# Patient Record
Sex: FEMALE | Race: BLACK OR AFRICAN AMERICAN | Employment: FULL TIME | URBAN - METROPOLITAN AREA
[De-identification: names, ages, dates, MRNs, and addresses within clinical notes are randomized per-mention and may not be internally consistent; named-entity substitution may affect disease eponyms.]

---

## 2022-01-01 ENCOUNTER — HOSPITAL ENCOUNTER (EMERGENCY)
Age: 32
Discharge: HOME | End: 2022-01-01
Attending: EMERGENCY MEDICINE
Payer: COMMERCIAL

## 2022-01-01 VITALS
HEART RATE: 106 BPM | SYSTOLIC BLOOD PRESSURE: 125 MMHG | TEMPERATURE: 99.5 F | RESPIRATION RATE: 18 BRPM | OXYGEN SATURATION: 97 % | DIASTOLIC BLOOD PRESSURE: 84 MMHG

## 2022-01-01 DIAGNOSIS — U07.1 COVID-19: Primary | ICD-10-CM

## 2022-01-01 LAB
COVID-19 RAPID TEST, COVR: DETECTED
SOURCE, COVRS: ABNORMAL

## 2022-01-01 PROCEDURE — 87635 SARS-COV-2 COVID-19 AMP PRB: CPT

## 2022-01-01 PROCEDURE — 99283 EMERGENCY DEPT VISIT LOW MDM: CPT

## 2022-01-01 NOTE — ED NOTES
I have reviewed discharge instructions with the patient. The patient verbalized understanding. Patient left ED via Discharge Method: ambulatory to Home with self. Opportunity for questions and clarification provided. Patient given 0 scripts. To continue your aftercare when you leave the hospital, you may receive an automated call from our care team to check in on how you are doing. This is a free service and part of our promise to provide the best care and service to meet your aftercare needs.  If you have questions, or wish to unsubscribe from this service please call [phone]. Thank you for Choosing our [facility] Emergency Department.

## 2022-01-01 NOTE — ED PROVIDER NOTES
HPI Salma is a 26 yo F with hx htn and diabetes, here for URI symptoms. Started Wednesday. Has been unable to get covid tested because she gets weak with excessive activity. Tachy 110 upon arrival, oxygen around 90%, improves at rest. Complains of fever, cough, chills, decreased appetite, weakness. Is vaccinated twice. Tolerating po fluids. No past medical history on file. No past surgical history on file. No family history on file. Social History     Socioeconomic History    Marital status: Not on file     Spouse name: Not on file    Number of children: Not on file    Years of education: Not on file    Highest education level: Not on file   Occupational History    Not on file   Tobacco Use    Smoking status: Not on file    Smokeless tobacco: Not on file   Substance and Sexual Activity    Alcohol use: Not on file    Drug use: Not on file    Sexual activity: Not on file   Other Topics Concern    Not on file   Social History Narrative    Not on file     Social Determinants of Health     Financial Resource Strain:     Difficulty of Paying Living Expenses: Not on file   Food Insecurity:     Worried About Running Out of Food in the Last Year: Not on file    Jeremie of Food in the Last Year: Not on file   Transportation Needs:     Lack of Transportation (Medical): Not on file    Lack of Transportation (Non-Medical):  Not on file   Physical Activity:     Days of Exercise per Week: Not on file    Minutes of Exercise per Session: Not on file   Stress:     Feeling of Stress : Not on file   Social Connections:     Frequency of Communication with Friends and Family: Not on file    Frequency of Social Gatherings with Friends and Family: Not on file    Attends Church Services: Not on file    Active Member of Clubs or Organizations: Not on file    Attends Club or Organization Meetings: Not on file    Marital Status: Not on file   Intimate Partner Violence:     Fear of Current or Ex-Partner: Not on file    Emotionally Abused: Not on file    Physically Abused: Not on file    Sexually Abused: Not on file   Housing Stability:     Unable to Pay for Housing in the Last Year: Not on file    Number of Places Lived in the Last Year: Not on file    Unstable Housing in the Last Year: Not on file         ALLERGIES: Patient has no allergy information on record. Review of Systems   Constitutional: Positive for appetite change (decreased), chills and fever. Negative for activity change. HENT: Positive for congestion. Negative for sore throat. Respiratory: Positive for cough. Negative for shortness of breath. Gastrointestinal: Negative for abdominal pain, diarrhea, nausea and vomiting. Genitourinary: Negative for difficulty urinating. Musculoskeletal: Negative for arthralgias. Skin: Negative for wound. Neurological: Positive for weakness. Negative for headaches. Hematological: Negative. There were no vitals filed for this visit. Physical Exam  Constitutional:       Appearance: Normal appearance. She is obese. She is ill-appearing. HENT:      Mouth/Throat:      Mouth: Mucous membranes are moist.      Pharynx: Oropharynx is clear. Eyes:      Pupils: Pupils are equal, round, and reactive to light. Cardiovascular:      Rate and Rhythm: Normal rate and regular rhythm. Pulmonary:      Effort: Pulmonary effort is normal. No respiratory distress. Breath sounds: Normal breath sounds. Comments: Respiration even and unlabored  Abdominal:      General: Bowel sounds are normal.      Tenderness: There is no abdominal tenderness. There is no guarding. Musculoskeletal:      Cervical back: Normal range of motion. Skin:     General: Skin is warm. Neurological:      General: No focal deficit present. Mental Status: She is alert and oriented to person, place, and time. Mental status is at baseline.    Psychiatric:         Mood and Affect: Mood normal. Thought Content: Thought content normal.      Comments: In no acute distress. TRISTON Marsh is a 26 yo F with hx htn and diabetes, here for URI symptoms. Will test for COVID. Mother is positive. Did have drop in o2 upon arrival in triage. Improved at rest. Is tachycardic. Will recommend ibuprofen and tylenol. covid +. Discharge home with symptomatic management. Her oxygen stable and his heart rate is come down. Work note provided. Safe for discharge at this time with strict return precautions. Dispo: Stable, Discharge to home    Diagnosis:   1.covid-19  2. Shine Hurtado NP  1:29 PM      Eugene Richard NP; 1/1/2022 @1:29 PM Voice dictation software was used during the making of this note. This software is not perfect and grammatical and other typographical errors may be present.   This note has not been proofread for errors.  ===================================================================

## 2022-01-01 NOTE — Clinical Note
[id number] [address] EMERGENCY DEPT  [address] [address] [phone]    Work/School Note    Date: 1/1/2022     To Whom It May concern:    Mackenzie iJm was evaluated by the following provider(s):  No providers found. COVID19 virus is suspected. Per the CDC guidelines we recommend home isolation until the following conditions are all met:    1. At least five days have passed since symptoms first appeared and/or had a close exposure,   2. After home isolation for five days, wearing a mask around others for the next five days,  3. At least 24 have passed since last fever without the use of fever-reducing medications and  4.  Symptoms (eg cough, shortness of breath) have improved      Sincerely,          Erica Pablo, NP

## 2022-05-01 ENCOUNTER — HOSPITAL ENCOUNTER (EMERGENCY)
Age: 32
Discharge: HOME | End: 2022-05-01
Attending: EMERGENCY MEDICINE
Payer: COMMERCIAL

## 2022-05-01 VITALS
RESPIRATION RATE: 16 BRPM | WEIGHT: 241 LBS | HEART RATE: 87 BPM | HEIGHT: 62 IN | OXYGEN SATURATION: 98 % | TEMPERATURE: 98.4 F | SYSTOLIC BLOOD PRESSURE: 138 MMHG | DIASTOLIC BLOOD PRESSURE: 82 MMHG | BODY MASS INDEX: 44.35 KG/M2

## 2022-05-01 DIAGNOSIS — L02.91 ABSCESS: Primary | ICD-10-CM

## 2022-05-01 PROCEDURE — 75810000289 HC I&D ABSCESS SIMP/COMP/MULT

## 2022-05-01 PROCEDURE — 99283 EMERGENCY DEPT VISIT LOW MDM: CPT

## 2022-05-01 PROCEDURE — 74011250637 HC RX REV CODE- 250/637: Performed by: EMERGENCY MEDICINE

## 2022-05-01 RX ORDER — BUSPIRONE HYDROCHLORIDE 10 MG/1
10 TABLET ORAL 3 TIMES DAILY
COMMUNITY

## 2022-05-01 RX ORDER — IBUPROFEN 800 MG/1
800 TABLET ORAL
Status: COMPLETED | OUTPATIENT
Start: 2022-05-01 | End: 2022-05-01

## 2022-05-01 RX ORDER — PREGABALIN 75 MG/1
75 CAPSULE ORAL 2 TIMES DAILY
COMMUNITY

## 2022-05-01 RX ORDER — ALBUTEROL SULFATE 90 UG/1
1 AEROSOL, METERED RESPIRATORY (INHALATION)
COMMUNITY

## 2022-05-01 RX ORDER — SULFAMETHOXAZOLE AND TRIMETHOPRIM 800; 160 MG/1; MG/1
1 TABLET ORAL 2 TIMES DAILY
Qty: 14 TABLET | Refills: 0 | Status: SHIPPED | OUTPATIENT
Start: 2022-05-01

## 2022-05-01 RX ORDER — AMLODIPINE BESYLATE 5 MG/1
5 TABLET ORAL DAILY
COMMUNITY

## 2022-05-01 RX ORDER — LISINOPRIL 5 MG/1
5 TABLET ORAL DAILY
COMMUNITY

## 2022-05-01 RX ORDER — DOCUSATE SODIUM 100 MG/1
100 CAPSULE, LIQUID FILLED ORAL 2 TIMES DAILY
COMMUNITY

## 2022-05-01 RX ORDER — CLINDAMYCIN PHOSPHATE 10 MG/G
GEL TOPICAL 2 TIMES DAILY
COMMUNITY

## 2022-05-01 RX ORDER — SULFAMETHOXAZOLE AND TRIMETHOPRIM 800; 160 MG/1; MG/1
2 TABLET ORAL
Status: COMPLETED | OUTPATIENT
Start: 2022-05-01 | End: 2022-05-01

## 2022-05-01 RX ORDER — PANTOPRAZOLE SODIUM 20 MG/1
20 TABLET, DELAYED RELEASE ORAL DAILY
COMMUNITY

## 2022-05-01 RX ORDER — DEXTROAMPHETAMINE SULFATE 10 MG/1
10 TABLET ORAL DAILY
COMMUNITY

## 2022-05-01 RX ORDER — SERTRALINE HYDROCHLORIDE 100 MG/1
100 TABLET, FILM COATED ORAL DAILY
COMMUNITY

## 2022-05-01 RX ADMIN — SULFAMETHOXAZOLE AND TRIMETHOPRIM 2 TABLET: 800; 160 TABLET ORAL at 02:16

## 2022-05-01 RX ADMIN — IBUPROFEN 800 MG: 800 TABLET, FILM COATED ORAL at 02:16

## 2022-05-01 NOTE — DISCHARGE INSTRUCTIONS
Follow-up with your family doctor or return here Tuesday morning for packing removal    Alternate 4 ibuprofen every 8 hours with 2 extra-strength tylenol every 6 hours

## 2022-05-01 NOTE — ED TRIAGE NOTES
Pt states that she has two boils bilaterally in the creases between her groin and leg. She complains that it is too painful to walk. This issue has been going on for years, she hasa had them drained multiple times but they keep coming back.  Pt also states she has hx of DM2 and HTN

## 2022-05-01 NOTE — ED PROVIDER NOTES
Chief complaint : Abscess    HISTORY OF PRESENT ILLNESS :  Location : Bilateral groin/medial thigh and    Quality : Painful    Quantity : Large and unruptured on the right, smaller and draining on the left    Timing : 3 or 4 days    Severity : Moderate    Context : History of multiple prior, most recent more than a year ago    Alleviating / exacerbating factors : None    Associated Symptoms : No fevers             No past medical history on file. No past surgical history on file. No family history on file. Social History     Socioeconomic History    Marital status: SINGLE     Spouse name: Not on file    Number of children: Not on file    Years of education: Not on file    Highest education level: Not on file   Occupational History    Not on file   Tobacco Use    Smoking status: Not on file    Smokeless tobacco: Not on file   Substance and Sexual Activity    Alcohol use: Not on file    Drug use: Not on file    Sexual activity: Not on file   Other Topics Concern    Not on file   Social History Narrative    Not on file     Social Determinants of Health     Financial Resource Strain:     Difficulty of Paying Living Expenses: Not on file   Food Insecurity:     Worried About Running Out of Food in the Last Year: Not on file    Jeremie of Food in the Last Year: Not on file   Transportation Needs:     Lack of Transportation (Medical): Not on file    Lack of Transportation (Non-Medical):  Not on file   Physical Activity:     Days of Exercise per Week: Not on file    Minutes of Exercise per Session: Not on file   Stress:     Feeling of Stress : Not on file   Social Connections:     Frequency of Communication with Friends and Family: Not on file    Frequency of Social Gatherings with Friends and Family: Not on file    Attends Caodaism Services: Not on file    Active Member of Clubs or Organizations: Not on file    Attends Club or Organization Meetings: Not on file    Marital Status: Not on file   Intimate Partner Violence:     Fear of Current or Ex-Partner: Not on file    Emotionally Abused: Not on file    Physically Abused: Not on file    Sexually Abused: Not on file   Housing Stability:     Unable to Pay for Housing in the Last Year: Not on file    Number of [place] in the Last Year: Not on file    Unstable Housing in the Last Year: Not on file         ALLERGIES: Penicillin g    Review of Systems   Constitutional: Negative for chills and fever. Skin: Positive for color change and wound. All other systems reviewed and are negative. Vitals:    05/01/22 0121   BP: 138/82   Pulse: 87   Resp: 16   Temp: 98.4 °F (36.9 °C)   SpO2: 98%   Weight: 109.3 kg (241 lb)   Height: 5' 2\" (1.575 m)            Physical Exam  Vitals and nursing note reviewed. Constitutional:       General: She is not in acute distress. Appearance: Normal appearance. She is well-developed. She is not ill-appearing, toxic-appearing or diaphoretic. HENT:      Head: Normocephalic and atraumatic. Right Ear: External ear normal.      Left Ear: External ear normal.   Eyes:      General:         Right eye: No discharge. Left eye: No discharge. Conjunctiva/sclera: Conjunctivae normal.   Pulmonary:      Effort: Pulmonary effort is normal. No respiratory distress. Genitourinary:      Musculoskeletal:         General: Normal range of motion. Cervical back: Normal range of motion and neck supple. Skin:     General: Skin is warm and dry. Findings: Erythema and lesion present. No rash. Neurological:      General: No focal deficit present. Mental Status: She is alert and oriented to person, place, and time. Mental status is at baseline. Motor: No abnormal muscle tone.       Comments: cni 2-12 grossly  Nl gait,  Nl speech     Psychiatric:         Mood and Affect: Mood normal.         Behavior: Behavior normal.          MDM  Number of Diagnoses or Management Options  Abscess: new and does not require workup  Diagnosis management comments: Medical decision making note:  Abscess to bilateral thighs, successful I&D on the right, no pus contained in the left abscess remnant  Start Septra, packing out in 2 days  This concludes the \"medical decision making note\" part of this emergency department visit note. Amount and/or Complexity of Data Reviewed  Decide to obtain previous medical records or to obtain history from someone other than the patient: yes    Risk of Complications, Morbidity, and/or Mortality  Presenting problems: low  Diagnostic procedures: minimal  Management options: low    Patient Progress  Patient progress: improved         I&D Abcess Complex    Date/Time: 5/1/2022 2:04 AM  Performed by: Abril Rod MD  Authorized by: Abril Rod MD     Consent:     Consent obtained:  Verbal    Consent given by:  Patient    Risks discussed:  Pain and incomplete drainage    Alternatives discussed:  No treatment  Location:     Type:  Abscess    Location:  Anogenital    Anogenital location:  Perineum  Pre-procedure details:     Skin preparation:  Betadine  Anesthesia (see MAR for exact dosages): Anesthesia method:  Local infiltration    Local anesthetic:  Lidocaine 1% w/o epi  Procedure type:     Complexity:  Complex  Procedure details:     Needle aspiration: no      Incision types:  Single straight    Incision depth:  Subcutaneous    Scalpel blade:  11    Wound management:  Probed and deloculated and irrigated with saline    Drainage:  Purulent    Drainage amount: Moderate    Packing materials:  1/4 in gauze    Amount 1/4\":  3\"  Post-procedure details:     Patient tolerance of procedure:   Tolerated well, no immediate complications

## 2022-05-04 ENCOUNTER — HOSPITAL ENCOUNTER (EMERGENCY)
Age: 32
Discharge: HOME | End: 2022-05-04
Attending: EMERGENCY MEDICINE
Payer: COMMERCIAL

## 2022-05-04 VITALS
HEIGHT: 62 IN | TEMPERATURE: 98.6 F | RESPIRATION RATE: 18 BRPM | DIASTOLIC BLOOD PRESSURE: 74 MMHG | SYSTOLIC BLOOD PRESSURE: 127 MMHG | OXYGEN SATURATION: 98 % | HEART RATE: 84 BPM | WEIGHT: 240 LBS | BODY MASS INDEX: 44.16 KG/M2

## 2022-05-04 DIAGNOSIS — L02.91 ABSCESS: Primary | ICD-10-CM

## 2022-05-04 PROCEDURE — 99282 EMERGENCY DEPT VISIT SF MDM: CPT

## 2022-05-04 NOTE — ED PROVIDER NOTES
Mask was worn during the entire patient examination. Tameka Pacheco is a 27 y.o. female who presents to the ED with a chief complaint of abscess recheck. Patient has a history of having I&D done 2 days ago on the right side of her perineum. Also had some small lesions on the left side. She states she has not had any fevers and chills since the procedure states the packing did come out spontaneously. She does have a Band-Aid over the area but nothing else. She has been taking her antibiotics and has not missed any doses. She feels that the lesions are getting better. No past medical history on file. No past surgical history on file. No family history on file. Social History     Socioeconomic History    Marital status: SINGLE     Spouse name: Not on file    Number of children: Not on file    Years of education: Not on file    Highest education level: Not on file   Occupational History    Not on file   Tobacco Use    Smoking status: Not on file    Smokeless tobacco: Not on file   Substance and Sexual Activity    Alcohol use: Not on file    Drug use: Not on file    Sexual activity: Not on file   Other Topics Concern    Not on file   Social History Narrative    Not on file     Social Determinants of Health     Financial Resource Strain:     Difficulty of Paying Living Expenses: Not on file   Food Insecurity:     Worried About Running Out of Food in the Last Year: Not on file    Jeremie of Food in the Last Year: Not on file   Transportation Needs:     Lack of Transportation (Medical): Not on file    Lack of Transportation (Non-Medical):  Not on file   Physical Activity:     Days of Exercise per Week: Not on file    Minutes of Exercise per Session: Not on file   Stress:     Feeling of Stress : Not on file   Social Connections:     Frequency of Communication with Friends and Family: Not on file    Frequency of Social Gatherings with Friends and Family: Not on file   Stephani Draft Attends Christianity Services: Not on file    Active Member of Clubs or Organizations: Not on file    Attends Club or Organization Meetings: Not on file    Marital Status: Not on file   Intimate Partner Violence:     Fear of Current or Ex-Partner: Not on file    Emotionally Abused: Not on file    Physically Abused: Not on file    Sexually Abused: Not on file   Housing Stability:     Unable to Pay for Housing in the Last Year: Not on file    Number of [place] in the Last Year: Not on file    Unstable Housing in the Last Year: Not on file         ALLERGIES: Penicillin g    Review of Systems   Constitutional: Negative for chills and fever. Skin: Positive for wound. Negative for color change and pallor. Neurological: Negative for weakness and numbness. Vitals:    05/04/22 1043 05/04/22 1133   BP: 127/74    Pulse: 84    Resp: 18    Temp: 98.6 °F (37 °C)    SpO2: 98% 98%   Weight: 108.9 kg (240 lb)    Height: 5' 2\" (1.575 m)             Physical Exam  Vitals and nursing note reviewed. Constitutional:       General: She is not in acute distress. Appearance: She is well-developed. She is not ill-appearing, toxic-appearing or diaphoretic. HENT:      Head: Normocephalic and atraumatic. Eyes:      General: No scleral icterus. Conjunctiva/sclera: Conjunctivae normal.   Pulmonary:      Effort: Pulmonary effort is normal. No respiratory distress. Breath sounds: No stridor. Musculoskeletal:      Cervical back: No rigidity. Skin:     Coloration: Skin is not jaundiced or pale. Comments: I reviewed previous physicians documentation and compared it to today. There are several small erythematous areas about 1 cm in largest diameter. No active drainage or surrounding cellulitis. Neurological:      Mental Status: She is alert. Mental status is at baseline.    Psychiatric:         Mood and Affect: Mood normal.         Behavior: Behavior normal.          MDM  Number of Diagnoses or Management Options  Abscess  Diagnosis management comments: The area of abscess seems to be well-healing no need for any additional incision and drainage we will have her continue Bactrim. Exam completed with female nurse in the room. Susanne Rust MD; 5/4/2022 @12:08 PM Voice dictation software was used during the making of this note. This software is not perfect and grammatical and other typographical errors may be present.   This note has not been proofread for errors.  ====================================            Procedures

## 2022-05-04 NOTE — ED NOTES
I have reviewed discharge instructions with the patient. The patient verbalized understanding. Patient left ED via Discharge Method: ambulatory to Home with self. Opportunity for questions and clarification provided. Patient given 0 scripts. To continue your aftercare when you leave the hospital, you may receive an automated call from our care team to check in on how you are doing. This is a free service and part of our promise to provide the best care and service to meet your aftercare needs.  If you have questions, or wish to unsubscribe from this service please call [phone]. Thank you for Choosing our [facility] Emergency Department.

## 2022-05-04 NOTE — ED TRIAGE NOTES
Patient presents with needing abscess check. States she got 2 lanced on Saturday on her inner thighs and needs to check them.

## 2022-08-21 ENCOUNTER — HOSPITAL ENCOUNTER (EMERGENCY)
Age: 32
Discharge: HOME | End: 2022-08-21
Attending: EMERGENCY MEDICINE
Payer: COMMERCIAL

## 2022-08-21 VITALS
OXYGEN SATURATION: 98 % | TEMPERATURE: 98.8 F | RESPIRATION RATE: 18 BRPM | BODY MASS INDEX: 42.52 KG/M2 | HEIGHT: 63 IN | SYSTOLIC BLOOD PRESSURE: 124 MMHG | WEIGHT: 240 LBS | HEART RATE: 90 BPM | DIASTOLIC BLOOD PRESSURE: 89 MMHG

## 2022-08-21 DIAGNOSIS — N76.0 BV (BACTERIAL VAGINOSIS): Primary | ICD-10-CM

## 2022-08-21 DIAGNOSIS — B96.89 BV (BACTERIAL VAGINOSIS): Primary | ICD-10-CM

## 2022-08-21 LAB
APPEARANCE UR: CLEAR
BILIRUB UR QL: NEGATIVE
COLOR UR: YELLOW
GLUCOSE UR STRIP.AUTO-MCNC: >1000 MG/DL
HCG UR QL: NEGATIVE
HGB UR QL STRIP: NEGATIVE
KETONES UR QL STRIP.AUTO: NEGATIVE MG/DL
LEUKOCYTE ESTERASE UR QL STRIP.AUTO: NEGATIVE
NITRITE UR QL STRIP.AUTO: NEGATIVE
PH UR STRIP: 6 [PH] (ref 5–9)
PROT UR STRIP-MCNC: NEGATIVE MG/DL
SERVICE CMNT-IMP: NORMAL
SP GR UR REFRACTOMETRY: 1.01 (ref 1–1.02)
UROBILINOGEN UR QL STRIP.AUTO: 0.2 EU/DL (ref 0.2–1)
WET PREP GENITAL: NORMAL

## 2022-08-21 PROCEDURE — 6360000002 HC RX W HCPCS: Performed by: EMERGENCY MEDICINE

## 2022-08-21 PROCEDURE — 81025 URINE PREGNANCY TEST: CPT

## 2022-08-21 PROCEDURE — 96372 THER/PROPH/DIAG INJ SC/IM: CPT

## 2022-08-21 PROCEDURE — 2500000003 HC RX 250 WO HCPCS: Performed by: EMERGENCY MEDICINE

## 2022-08-21 PROCEDURE — 87491 CHLMYD TRACH DNA AMP PROBE: CPT

## 2022-08-21 PROCEDURE — 81003 URINALYSIS AUTO W/O SCOPE: CPT

## 2022-08-21 PROCEDURE — 87210 SMEAR WET MOUNT SALINE/INK: CPT

## 2022-08-21 PROCEDURE — 99284 EMERGENCY DEPT VISIT MOD MDM: CPT

## 2022-08-21 RX ORDER — AMLODIPINE BESYLATE 5 MG/1
5 TABLET ORAL DAILY
COMMUNITY
Start: 2021-12-01

## 2022-08-21 RX ORDER — METRONIDAZOLE 500 MG/1
500 TABLET ORAL 2 TIMES DAILY
Qty: 14 TABLET | Refills: 0 | Status: SHIPPED | OUTPATIENT
Start: 2022-08-21 | End: 2022-08-28

## 2022-08-21 RX ORDER — PANTOPRAZOLE SODIUM 20 MG/1
20 TABLET, DELAYED RELEASE ORAL 2 TIMES DAILY
COMMUNITY
Start: 2022-07-22

## 2022-08-21 RX ORDER — DEXTROAMPHETAMINE SULFATE 10 MG/1
10 TABLET ORAL DAILY
COMMUNITY

## 2022-08-21 RX ORDER — SERTRALINE HYDROCHLORIDE 100 MG/1
100 TABLET, FILM COATED ORAL DAILY
COMMUNITY
Start: 2022-04-27

## 2022-08-21 RX ORDER — ALBUTEROL SULFATE 90 UG/1
2 AEROSOL, METERED RESPIRATORY (INHALATION) EVERY 4 HOURS PRN
COMMUNITY
Start: 2021-11-10

## 2022-08-21 RX ORDER — LISINOPRIL 5 MG/1
5 TABLET ORAL DAILY
COMMUNITY
Start: 2022-02-23

## 2022-08-21 RX ORDER — PSEUDOEPHEDRINE HCL 30 MG
100 TABLET ORAL 2 TIMES DAILY PRN
COMMUNITY
Start: 2022-03-31

## 2022-08-21 RX ORDER — DOXYCYCLINE HYCLATE 100 MG
100 TABLET ORAL 2 TIMES DAILY
Qty: 14 TABLET | Refills: 0 | Status: SHIPPED | OUTPATIENT
Start: 2022-08-21 | End: 2022-08-28

## 2022-08-21 RX ADMIN — CEFTRIAXONE SODIUM 500 MG: 500 INJECTION, POWDER, FOR SOLUTION INTRAMUSCULAR; INTRAVENOUS at 11:17

## 2022-08-21 ASSESSMENT — ENCOUNTER SYMPTOMS
NAUSEA: 0
ABDOMINAL PAIN: 0
VOMITING: 0

## 2022-08-21 ASSESSMENT — PAIN - FUNCTIONAL ASSESSMENT: PAIN_FUNCTIONAL_ASSESSMENT: NONE - DENIES PAIN

## 2022-08-21 NOTE — ED PROVIDER NOTES
[facility] Emergency Department Provider Note                   PCP:                SCOTT Nuñez NP               Age: 27 y.o. Sex: female       ICD-10-CM    1. BV (bacterial vaginosis)  N76.0     B96.89           DISPOSITION Decision To Discharge 08/21/2022 12:07:42 PM        MDM     Amount and/or Complexity of Data Reviewed  Clinical lab tests: ordered  Review and summarize past medical records: yes    Risk of Complications, Morbidity, and/or Mortality  Presenting problems: low  Diagnostic procedures: minimal  Management options: low         Orders Placed This Encounter   Procedures    Wet prep, genital    C.trachomatis N.gonorrhoeae DNA    Pregnancy, Urine    Urinalysis    Nursing communication        Maddison Corona is a 27 y.o. female who presents to the Emergency Department with chief complaint of    Chief Complaint   Patient presents with    Vaginal Itching      60-year-old black female with no significant past medical history presents the emergency department complaining of vaginal discharge for the last 2 weeks, unresponsive to 2 separate doses of Diflucan. Patient does describe some mild vaginal itching as well. She denies any rash, fever or chills. She denies any dysuria, hematuria or increased frequency. She does describe some unprotected intercourse a week or 2 before, but none since. No contraception at that time. She denies any abdominal pain, change in bowel habits, melena hematochezia. No exacerbating factors. The history is provided by the patient. Review of Systems   Constitutional:  Negative for chills and fever. Gastrointestinal:  Negative for abdominal pain, nausea and vomiting. Genitourinary:  Positive for vaginal discharge. Negative for dysuria, frequency and vaginal pain. All other systems reviewed and are negative. Past Medical History:   Diagnosis Date    Diabetes mellitus ([facility] [address]     Hypertension         History reviewed.  No pertinent surgical history. History reviewed. No pertinent family history. Social History     Socioeconomic History    Marital status: Single     Spouse name: None    Number of children: None    Years of education: None    Highest education level: None   Tobacco Use    Smoking status: Never    Smokeless tobacco: Never         Clindamycin and Penicillins     Previous Medications    ALBUTEROL SULFATE HFA (PROVENTIL;VENTOLIN;PROAIR) 108 (90 BASE) MCG/ACT INHALER    Inhale 2 puffs into the lungs every 4 hours as needed    AMLODIPINE (NORVASC) 5 MG TABLET    Take 5 mg by mouth daily    CARIPRAZINE HCL (VRAYLAR) 1.5 MG CAPSULE    Take 1.5 mg by mouth    DEXTROAMPHETAMINE (DEXTROSTAT) 10 MG TABLET    Take 10 mg by mouth daily. DOCUSATE (COLACE, DULCOLAX) 100 MG CAPS    Take 100 mg by mouth 2 times daily as needed    EMPAGLIFLOZIN (JARDIANCE) 25 MG TABLET    Take 25 mg by mouth daily    LISINOPRIL (PRINIVIL;ZESTRIL) 5 MG TABLET    Take 5 mg by mouth daily    PANTOPRAZOLE (PROTONIX) 20 MG TABLET    Take 20 mg by mouth 2 times daily    SERTRALINE (ZOLOFT) 100 MG TABLET    Take 100 mg by mouth daily    SITAGLIPTIN (JANUVIA) 50 MG TABLET    Take 50 mg by mouth daily        Vitals signs and nursing note reviewed. Patient Vitals for the past 4 hrs:   Temp Pulse Resp BP SpO2   08/21/22 1026 98.8 °F (37.1 °C) 90 18 124/89 98 %          Physical Exam  Vitals and nursing note reviewed. Constitutional:       General: She is not in acute distress. HENT:      Head: Normocephalic and atraumatic. Nose: Nose normal.      Mouth/Throat:      Mouth: Mucous membranes are moist.   Eyes:      Extraocular Movements: Extraocular movements intact. Conjunctiva/sclera: Conjunctivae normal.      Pupils: Pupils are equal, round, and reactive to light. Cardiovascular:      Rate and Rhythm: Normal rate and regular rhythm. Heart sounds: No murmur heard.   Pulmonary:      Effort: Pulmonary effort is normal.      Breath sounds: Normal breath sounds. Abdominal:      General: Abdomen is flat. Palpations: There is no mass. Tenderness: There is no abdominal tenderness. There is no right CVA tenderness or left CVA tenderness. Musculoskeletal:         General: Normal range of motion. Cervical back: Normal range of motion and neck supple. Skin:     General: Skin is warm and dry. Neurological:      General: No focal deficit present. Mental Status: She is alert and oriented to person, place, and time. Psychiatric:         Mood and Affect: Mood and affect normal.         Speech: Speech normal.        Procedures    The patient was observed in the ED. over concern of possible STD, the patient was given Rocephin here in the emergency department. She will be given a prescription for doxycycline should her testing come back positive for chlamydia. Instructed follow-up with family doctor 1 week if not significant improved.     Results Reviewed:      Recent Results (from the past 24 hour(s))   Wet prep, genital    Collection Time: 08/21/22 11:07 AM    Specimen: Vaginal   Result Value Ref Range    Special Requests NO SPECIAL REQUESTS      Wet Prep NO WBC'S SEEN      Wet Prep NO YEAST SEEN      Wet Prep NO TRICHOMONAS SEEN      Wet Prep FEW  CLUE CELLS PRESENT       Pregnancy, Urine    Collection Time: 08/21/22 11:07 AM   Result Value Ref Range    HCG(Urine) Pregnancy Test Negative     Urinalysis    Collection Time: 08/21/22 11:07 AM   Result Value Ref Range    Color, UA YELLOW      Appearance CLEAR      Specific Gravity, UA 1.015 1.001 - 1.023      pH, Urine 6.0 5.0 - 9.0      Protein, UA Negative NEG mg/dL    Glucose, UA >1000 mg/dL    Ketones, Urine Negative NEG mg/dL    Bilirubin Urine Negative NEG      Blood, Urine Negative NEG      Urobilinogen, Urine 0.2 0.2 - 1.0 EU/dL    Nitrite, Urine Negative NEG      Leukocyte Esterase, Urine Negative NEG         I discussed the results of all labs, procedures, radiographs, and treatments with the patient and available family. Treatment plan is agreed upon and the patient is ready for discharge. All voiced understanding of the discharge plan and medication instructions or changes as appropriate. Questions about treatment in the ED were answered. All were encouraged to return should symptoms worsen or new problems develop. Voice dictation software was used during the making of this note. This software is not perfect and grammatical and other typographical errors may be present. This note has not been completely proofread for errors.      Domingo Paige MD  08/21/22 3801

## 2022-08-21 NOTE — DISCHARGE INSTRUCTIONS
Take the metronidazole/Flagyl as directed. If your urine comes back positive for chlamydia, you will need to take the doxycycline as directed. You do not need started today, and if testing is negative for chlamydia you can discard the prescription. If positive, you should follow-up with your family doctor or the health department for further testing.

## 2022-08-21 NOTE — ED TRIAGE NOTES
\"It's been a week now since i've been having white, chunky discharge. Two different doctor have called me in diflucan. But nothing is working.  Not even the creams\"

## 2022-08-21 NOTE — ED NOTES
I have reviewed discharge instructions with the patient. The patient verbalized understanding. Patient left ED via Discharge Method: ambulatory to Home with self. Opportunity for questions and clarification provided. Patient given 2 scripts. To continue your aftercare when you leave the hospital, you may receive an automated call from our care team to check in on how you are doing. This is a free service and part of our promise to provide the best care and service to meet your aftercare needs.  If you have questions, or wish to unsubscribe from this service please call [phone]. Thank you for Choosing our [facility] Emergency Department.        Areli Lowery RN  08/21/22 0097

## 2022-08-25 LAB
C TRACH RRNA SPEC QL NAA+PROBE: NEGATIVE
N GONORRHOEA RRNA SPEC QL NAA+PROBE: NEGATIVE
SPECIMEN SOURCE: NORMAL

## 2022-10-15 ENCOUNTER — HOSPITAL ENCOUNTER (EMERGENCY)
Age: 32
Discharge: HOME | End: 2022-10-15
Attending: EMERGENCY MEDICINE
Payer: COMMERCIAL

## 2022-10-15 VITALS
HEIGHT: 63 IN | OXYGEN SATURATION: 98 % | RESPIRATION RATE: 18 BRPM | SYSTOLIC BLOOD PRESSURE: 140 MMHG | WEIGHT: 245 LBS | TEMPERATURE: 99.3 F | HEART RATE: 77 BPM | BODY MASS INDEX: 43.41 KG/M2 | DIASTOLIC BLOOD PRESSURE: 91 MMHG

## 2022-10-15 DIAGNOSIS — R10.9 ABDOMINAL CRAMPING: ICD-10-CM

## 2022-10-15 DIAGNOSIS — O21.9 NAUSEA/VOMITING IN PREGNANCY: Primary | ICD-10-CM

## 2022-10-15 LAB
ABO + RH BLD: NORMAL
ALBUMIN SERPL-MCNC: 4 G/DL (ref 3.5–5)
ALBUMIN/GLOB SERPL: 0.9 {RATIO} (ref 0.4–1.6)
ALP SERPL-CCNC: 96 U/L (ref 45–117)
ALT SERPL-CCNC: 9 U/L (ref 13–61)
ANION GAP SERPL CALC-SCNC: 15 MMOL/L (ref 2–11)
APPEARANCE UR: CLEAR
AST SERPL-CCNC: 21 U/L (ref 15–37)
BACTERIA URNS QL MICRO: 0 /HPF
BASOPHILS # BLD: 0.1 K/UL (ref 0–0.2)
BASOPHILS NFR BLD: 1 % (ref 0–2)
BILIRUB SERPL-MCNC: 0.2 MG/DL (ref 0.2–1.1)
BILIRUB UR QL: NEGATIVE
BUN SERPL-MCNC: 12 MG/DL (ref 7–18)
CALCIUM SERPL-MCNC: 9.7 MG/DL (ref 8.3–10.4)
CASTS URNS QL MICRO: 0 /LPF
CHLORIDE SERPL-SCNC: 97 MMOL/L (ref 98–107)
CO2 SERPL-SCNC: 22 MMOL/L (ref 21–32)
COLOR UR: YELLOW
CREAT SERPL-MCNC: 0.67 MG/DL (ref 0.6–1)
CRYSTALS URNS QL MICRO: 0 /LPF
DIFFERENTIAL METHOD BLD: ABNORMAL
EOSINOPHIL # BLD: 0.1 K/UL (ref 0–0.8)
EOSINOPHIL NFR BLD: 1 % (ref 0.5–7.8)
EPI CELLS #/AREA URNS HPF: NORMAL /HPF
ERYTHROCYTE [DISTWIDTH] IN BLOOD BY AUTOMATED COUNT: 16.1 % (ref 11.9–14.6)
GLOBULIN SER CALC-MCNC: 4.4 G/DL (ref 2.8–4.5)
GLUCOSE SERPL-MCNC: 269 MG/DL (ref 65–100)
GLUCOSE UR STRIP.AUTO-MCNC: >1000 MG/DL
HCG SERPL-ACNC: ABNORMAL MIU/ML (ref 0–6)
HCG UR QL: POSITIVE
HCT VFR BLD AUTO: 36.1 % (ref 35.8–46.3)
HGB BLD-MCNC: 11 G/DL (ref 11.7–15.4)
HGB UR QL STRIP: ABNORMAL
IMM GRANULOCYTES # BLD AUTO: 0 K/UL (ref 0–0.5)
IMM GRANULOCYTES NFR BLD AUTO: 0 % (ref 0–5)
KETONES UR QL STRIP.AUTO: 15 MG/DL
LEUKOCYTE ESTERASE UR QL STRIP.AUTO: NEGATIVE
LYMPHOCYTES # BLD: 2.5 K/UL (ref 0.5–4.6)
LYMPHOCYTES NFR BLD: 20 % (ref 13–44)
MCH RBC QN AUTO: 23 PG (ref 26.1–32.9)
MCHC RBC AUTO-ENTMCNC: 30.5 G/DL (ref 31.4–35)
MCV RBC AUTO: 75.4 FL (ref 82–102)
MONOCYTES # BLD: 0.7 K/UL (ref 0.1–1.3)
MONOCYTES NFR BLD: 6 % (ref 4–12)
MUCOUS THREADS URNS QL MICRO: 0 /LPF
NEUTS SEG # BLD: 9.2 K/UL (ref 1.7–8.2)
NEUTS SEG NFR BLD: 73 % (ref 43–78)
NITRITE UR QL STRIP.AUTO: NEGATIVE
NRBC # BLD: 0 K/UL (ref 0–0.2)
OTHER OBSERVATIONS: NORMAL
PH UR STRIP: 5 [PH] (ref 5–9)
PLATELET # BLD AUTO: 397 K/UL (ref 150–450)
PMV BLD AUTO: 10.3 FL (ref 9.4–12.3)
POTASSIUM SERPL-SCNC: 4.4 MMOL/L (ref 3.5–5.1)
PROT SERPL-MCNC: 8.4 G/DL (ref 6.4–8.2)
PROT UR STRIP-MCNC: NEGATIVE MG/DL
RBC # BLD AUTO: 4.79 M/UL (ref 4.05–5.2)
RBC #/AREA URNS HPF: NORMAL /HPF
SODIUM SERPL-SCNC: 134 MMOL/L (ref 133–143)
SP GR UR REFRACTOMETRY: 1.01 (ref 1–1.02)
UROBILINOGEN UR QL STRIP.AUTO: 0.2 EU/DL (ref 0.2–1)
WBC # BLD AUTO: 12.6 K/UL (ref 4.3–11.1)
WBC URNS QL MICRO: NORMAL /HPF
YEAST URNS QL MICRO: NORMAL

## 2022-10-15 PROCEDURE — 86900 BLOOD TYPING SEROLOGIC ABO: CPT

## 2022-10-15 PROCEDURE — 81003 URINALYSIS AUTO W/O SCOPE: CPT

## 2022-10-15 PROCEDURE — 81015 MICROSCOPIC EXAM OF URINE: CPT

## 2022-10-15 PROCEDURE — 85025 COMPLETE CBC W/AUTO DIFF WBC: CPT

## 2022-10-15 PROCEDURE — 81025 URINE PREGNANCY TEST: CPT

## 2022-10-15 PROCEDURE — 84702 CHORIONIC GONADOTROPIN TEST: CPT

## 2022-10-15 PROCEDURE — 6360000002 HC RX W HCPCS: Performed by: EMERGENCY MEDICINE

## 2022-10-15 PROCEDURE — 96374 THER/PROPH/DIAG INJ IV PUSH: CPT

## 2022-10-15 PROCEDURE — 80053 COMPREHEN METABOLIC PANEL: CPT

## 2022-10-15 PROCEDURE — 99284 EMERGENCY DEPT VISIT MOD MDM: CPT

## 2022-10-15 RX ORDER — METOCLOPRAMIDE 10 MG/1
10 TABLET ORAL 3 TIMES DAILY PRN
Qty: 15 TABLET | Refills: 0 | Status: SHIPPED | OUTPATIENT
Start: 2022-10-15

## 2022-10-15 RX ORDER — ONDANSETRON 4 MG/1
4 TABLET, ORALLY DISINTEGRATING ORAL 3 TIMES DAILY PRN
Qty: 15 TABLET | Refills: 1 | Status: SHIPPED | OUTPATIENT
Start: 2022-10-15

## 2022-10-15 RX ORDER — ONDANSETRON 2 MG/ML
4 INJECTION INTRAMUSCULAR; INTRAVENOUS
Status: COMPLETED | OUTPATIENT
Start: 2022-10-15 | End: 2022-10-15

## 2022-10-15 RX ORDER — LITHIUM CARBONATE 300 MG/1
300 TABLET, FILM COATED, EXTENDED RELEASE ORAL 2 TIMES DAILY
COMMUNITY

## 2022-10-15 RX ADMIN — ONDANSETRON 4 MG: 2 INJECTION INTRAMUSCULAR; INTRAVENOUS at 17:10

## 2022-10-15 ASSESSMENT — PAIN - FUNCTIONAL ASSESSMENT: PAIN_FUNCTIONAL_ASSESSMENT: NONE - DENIES PAIN

## 2022-10-15 NOTE — ED TRIAGE NOTES
\"Been nauseated with for the past two weeks.  My mom made me take a pregnancy last night and it was positive but I'm in denial\"

## 2022-10-15 NOTE — DISCHARGE INSTRUCTIONS
Clear liquids if nauseated. May try nausea medication as well. Call OB/GYN doctor Monday for appointment to recheck. Also come back here at 830 Monday morning for ultrasound appointment. Recheck if pain returns and persists. Also recheck for abnormal bleeding vomiting or high fever. Record blood sugars prior to seeing your OB/GYN doctor.

## 2022-10-16 ASSESSMENT — ENCOUNTER SYMPTOMS
NAUSEA: 1
BACK PAIN: 0
DIARRHEA: 0
VOMITING: 0
ABDOMINAL PAIN: 1
BLOOD IN STOOL: 0

## 2022-10-16 NOTE — ED PROVIDER NOTES
Emergency Department Provider Note                   PCP:                SCOTT Maldonado NP               Age: 34 y.o. Sex: female       ICD-10-CM    1. Nausea/vomiting in pregnancy  O21.9 Ultrasound OB less than 14 weeks single or first gestation      2. Abdominal cramping  R10.9 Ultrasound OB less than 14 weeks single or first gestation          DISPOSITION Decision To Discharge 10/15/2022 06:04:06 PM        MDM  Number of Diagnoses or Management Options  Abdominal cramping  Nausea/vomiting in pregnancy  Diagnosis management comments: Verify pregnancy. Screening lab work. Check blood sugar. Amount and/or Complexity of Data Reviewed  Clinical lab tests: ordered and reviewed    Risk of Complications, Morbidity, and/or Mortality  Presenting problems: moderate  Diagnostic procedures: minimal  Management options: low    Patient Progress  Patient progress: stable             Orders Placed This Encounter   Procedures    Ultrasound OB less than 14 weeks single or first gestation    Pregnancy, Urine    Urinalysis    Urinalysis, Micro    HCG, Quantitative, Pregnancy    CBC with Auto Differential    Comprehensive Metabolic Panel    ABO/RH    Insert peripheral IV        Medications   ondansetron (ZOFRAN) injection 4 mg (4 mg IntraVENous Given 10/15/22 1710)       Discharge Medication List as of 10/15/2022  6:26 PM        START taking these medications    Details   ondansetron (ZOFRAN-ODT) 4 MG disintegrating tablet Take 1 tablet by mouth 3 times daily as needed for Nausea or Vomiting, Disp-15 tablet, R-1Print      metoclopramide (REGLAN) 10 MG tablet Take 1 tablet by mouth 3 times daily as needed (Nausea), Disp-15 tablet, R-0Print              Edwardyola Daryn is a 34 y.o. female who presents to the Emergency Department with chief complaint of  No chief complaint on file. 34year-old comes in with 1 week history of nausea daily. Denies any vomiting diarrhea. Minimal suprapubic cramping.   Last menstrual period 2 months previously. Denies any surgery in the past.  She does have diabetes and some elevated blood pressure for which she takes medications. Home pregnancy test was positive. That would make her G1, P0    The history is provided by the patient. Nausea & Vomiting  Severity:  Moderate  Duration:  2 weeks  Timing:  Intermittent  Quality:  Stomach contents  Able to tolerate:  Liquids  Progression:  Unchanged  Chronicity:  New  Associated symptoms: abdominal pain (Occasional cramping over 2 weeks, none today)    Associated symptoms: no chills, no diarrhea and no fever        Review of Systems   Constitutional:  Negative for chills and fever. Gastrointestinal:  Positive for abdominal pain (Occasional cramping over 2 weeks, none today) and nausea. Negative for blood in stool, diarrhea and vomiting. Genitourinary:  Negative for difficulty urinating, dysuria and vaginal bleeding. Musculoskeletal:  Negative for back pain. Past Medical History:   Diagnosis Date    Diabetes mellitus ([address]     Hypertension         No past surgical history on file. No family history on file. Social History     Socioeconomic History    Marital status: Single   Tobacco Use    Smoking status: Never    Smokeless tobacco: Never         Clindamycin and Penicillins     Discharge Medication List as of 10/15/2022  6:26 PM        CONTINUE these medications which have NOT CHANGED    Details   lithium (LITHOBID) 300 MG extended release tablet Take 300 mg by mouth 2 times dailyHistorical Med      albuterol sulfate HFA (PROVENTIL;VENTOLIN;PROAIR) 108 (90 Base) MCG/ACT inhaler Inhale 2 puffs into the lungs every 4 hours as neededHistorical Med      amLODIPine (NORVASC) 5 MG tablet Take 5 mg by mouth dailyHistorical Med      cariprazine hcl (VRAYLAR) 1.5 MG capsule Take 1.5 mg by mouthHistorical Med      dextroamphetamine (DEXTROSTAT) 10 MG tablet Take 10 mg by mouth daily. Historical Med      docusate (COLACE, [address]) BACTERIA, URINE 0 0 /hpf    Casts 0 0 /lpf    Crystals 0 0 /LPF    Mucus, UA 0 0 /lpf    Yeast, UA OCCASIONAL      OTHER OBSERVATIONS RESULTS VERIFIED MANUALLY     HCG, Quantitative, Pregnancy   Result Value Ref Range    hCG Quant 22,998 (H) 0.0 - 6.0 MIU/ML   CBC with Auto Differential   Result Value Ref Range    WBC 12.6 (H) 4.3 - 11.1 K/uL    RBC 4.79 4.05 - 5.20 M/uL    Hemoglobin 11.0 (L) 11.7 - 15.4 g/dL    Hematocrit 36.1 35.8 - 46.3 %    MCV 75.4 (L) 82.0 - 102.0 FL    MCH 23.0 (L) 26.1 - 32.9 PG    MCHC 30.5 (L) 31.4 - 35.0 g/dL    RDW 16.1 (H) 11.9 - 14.6 %    Platelets 939 749 - 125 K/uL    MPV 10.3 9.4 - 12.3 FL    nRBC 0.00 0.0 - 0.2 K/uL    Differential Type AUTOMATED      Seg Neutrophils 73 43 - 78 %    Lymphocytes 20 13 - 44 %    Monocytes 6 4.0 - 12.0 %    Eosinophils % 1 0.5 - 7.8 %    Basophils 1 0.0 - 2.0 %    Immature Granulocytes 0 0.0 - 5.0 %    Segs Absolute 9.2 (H) 1.7 - 8.2 K/UL    Absolute Lymph # 2.5 0.5 - 4.6 K/UL    Absolute Mono # 0.7 0.1 - 1.3 K/UL    Absolute Eos # 0.1 0.0 - 0.8 K/UL    Basophils Absolute 0.1 0.0 - 0.2 K/UL    Absolute Immature Granulocyte 0.0 0.0 - 0.5 K/UL   Comprehensive Metabolic Panel   Result Value Ref Range    Sodium 134 133 - 143 mmol/L    Potassium 4.4 3.5 - 5.1 mmol/L    Chloride 97 (L) 98 - 107 mmol/L    CO2 22 21 - 32 mmol/L    Anion Gap 15 (H) 2 - 11 mmol/L    Glucose 269 (H) 65 - 100 mg/dL    BUN 12 7.0 - 18.0 MG/DL    Creatinine 0.67 0.6 - 1.0 MG/DL    Est, Glom Filt Rate >60 >60 ml/min/1.73m2    Calcium 9.7 8.3 - 10.4 MG/DL    Total Bilirubin 0.2 0.2 - 1.1 MG/DL    ALT 9 (L) 13.0 - 61.0 U/L    AST 21 15 - 37 U/L    Alk Phosphatase 96 45.0 - 117.0 U/L    Total Protein 8.4 (H) 6.4 - 8.2 g/dL    Albumin 4.0 3.5 - 5.0 g/dL    Globulin 4.4 2.8 - 4.5 g/dL    Albumin/Globulin Ratio 0.9 0.4 - 1.6     ABO/RH   Result Value Ref Range    ABO/Rh O NEGATIVE         Ultrasound OB less than 14 weeks single or first gestation    (Results Pending)      Attempted bedside ultrasound but could not get a clear picture of gestational sac due to body habitus and early gestation. Patient states she has been without any sort of pain or any symptoms other than nausea for more than 2 days. We will arrange for outpatient ultrasound and refer to OB/GYN for follow-up. Voice dictation software was used during the making of this note. This software is not perfect and grammatical and other typographical errors may be present. This note has not been completely proofread for errors.      Edy Garzon MD  10/16/22 9891

## 2022-10-17 ENCOUNTER — HOSPITAL ENCOUNTER (OUTPATIENT)
Dept: ULTRASOUND IMAGING | Age: 32
Discharge: HOME | End: 2022-10-19
Payer: COMMERCIAL

## 2022-10-17 DIAGNOSIS — O21.9 NAUSEA/VOMITING IN PREGNANCY: ICD-10-CM

## 2022-10-17 DIAGNOSIS — R10.9 ABDOMINAL CRAMPING: ICD-10-CM

## 2022-10-17 PROCEDURE — 76817 TRANSVAGINAL US OBSTETRIC: CPT

## 2023-01-17 ENCOUNTER — HOSPITAL ENCOUNTER (EMERGENCY)
Age: 33
Discharge: HOME | End: 2023-01-17
Attending: EMERGENCY MEDICINE | Admitting: EMERGENCY MEDICINE
Payer: COMMERCIAL

## 2023-01-17 VITALS
HEART RATE: 83 BPM | BODY MASS INDEX: 46.07 KG/M2 | OXYGEN SATURATION: 99 % | WEIGHT: 260 LBS | HEIGHT: 63 IN | RESPIRATION RATE: 18 BRPM | DIASTOLIC BLOOD PRESSURE: 96 MMHG | SYSTOLIC BLOOD PRESSURE: 149 MMHG | TEMPERATURE: 99 F

## 2023-01-17 DIAGNOSIS — R05.1 ACUTE COUGH: Primary | ICD-10-CM

## 2023-01-17 DIAGNOSIS — U07.1 COVID-19: ICD-10-CM

## 2023-01-17 LAB
FLUAV RNA SPEC QL NAA+PROBE: NOT DETECTED
FLUBV RNA SPEC QL NAA+PROBE: NOT DETECTED
SARS-COV-2 RDRP RESP QL NAA+PROBE: DETECTED
SOURCE: ABNORMAL

## 2023-01-17 PROCEDURE — 87635 SARS-COV-2 COVID-19 AMP PRB: CPT

## 2023-01-17 PROCEDURE — 99283 EMERGENCY DEPT VISIT LOW MDM: CPT

## 2023-01-17 PROCEDURE — 87502 INFLUENZA DNA AMP PROBE: CPT

## 2023-01-17 RX ORDER — GUAIFENESIN 600 MG/1
600 TABLET, EXTENDED RELEASE ORAL 2 TIMES DAILY
Qty: 30 TABLET | Refills: 0 | Status: SHIPPED | OUTPATIENT
Start: 2023-01-17 | End: 2023-02-01

## 2023-01-17 ASSESSMENT — LIFESTYLE VARIABLES
HOW MANY STANDARD DRINKS CONTAINING ALCOHOL DO YOU HAVE ON A TYPICAL DAY: PATIENT DOES NOT DRINK
HOW OFTEN DO YOU HAVE A DRINK CONTAINING ALCOHOL: NEVER

## 2023-01-17 ASSESSMENT — PAIN SCALES - GENERAL
PAINLEVEL_OUTOF10: 2

## 2023-01-17 ASSESSMENT — ENCOUNTER SYMPTOMS
COUGH: 1
SORE THROAT: 1
GASTROINTESTINAL NEGATIVE: 1

## 2023-01-17 ASSESSMENT — PAIN DESCRIPTION - LOCATION: LOCATION: THROAT

## 2023-01-17 ASSESSMENT — PAIN - FUNCTIONAL ASSESSMENT: PAIN_FUNCTIONAL_ASSESSMENT: 0-10

## 2023-01-17 NOTE — ED TRIAGE NOTES
Pt arrives for URI symptoms that have been present for 2-3 days. Pt also endorses fever that has been controlled w/o medications. Pt also endorses productive cough with scant amount of green mucus noted. No angina or SOB.
2022 23:18

## 2023-01-17 NOTE — ED PROVIDER NOTES
Emergency Department Provider Note                   PCP:                SCOTT Willis NP               Age: 34 y.o. Sex: female       ICD-10-CM    1. Acute cough  R05.1       2. COVID-19  U07.1           DISPOSITION Decision To Discharge 2023 10:02:05 AM        Medical Decision Making  Patient is a 65-year-old female G1, P0 approximately 19 weeks pregnant who presents with cough and congestion. No vaginal bleeding abdominal pain leaking fluid or urinary complaints. She is found to be COVID-positive. She has a benign exam.  Bedside ultrasound shows live intrauterine fetus with appropriate cardiac activity. Patient was able to visualize the fetus and was excited to see the baby on ultrasound. She was supposed to have an appointment today with her OB/GYN but they recommended she not come in because she was sick. She has an ultrasound scheduled for next week. Encouraged her to call her OB today. Discussed oral antiviral medications for COVID, but due to her complex history and current pregnancy state will defer to her high risk OB for treatment other than symptomatic care. Patient expressed understanding and agreeable. She is to follow-up with her OB/GYN and return if worsening in any way.                                 Orders Placed This Encounter   Procedures    Influenza A/B, Molecular    COVID-19, Rapid        Medications - No data to display    Discharge Medication List as of 2023 10:10 AM        START taking these medications    Details   guaiFENesin (MUCINEX) 600 MG extended release tablet Take 1 tablet by mouth 2 times daily for 15 days, Disp-30 tablet, R-0Print              Reanna Segura is a 34 y.o. female who presents to the Emergency Department with chief complaint of    Chief Complaint   Patient presents with    URI      Patient is 65-year-old female diabetic approximately 19 weeks pregnant  who presents with cough and congestion subjective fever that started 3 days ago. No known sick contacts. She says she is feeling better than she was yesterday, but continues to have a cough. Sometimes productive with scant mucus. No hemoptysis. No abdominal pain vaginal bleeding or leaking fluid. No urinary complaints. Initially started out as sore throat, but that has improved. She just wants something for the cough. Review of Systems   Constitutional: Negative. HENT:  Positive for congestion and sore throat. Respiratory:  Positive for cough. Cardiovascular: Negative. Gastrointestinal: Negative. Genitourinary: Negative. All other systems reviewed and are negative. Past Medical History:   Diagnosis Date    Diabetes mellitus ([facility] [address]     Hypertension         History reviewed. No pertinent surgical history. History reviewed. No pertinent family history.      Social History     Socioeconomic History    Marital status: Single     Spouse name: None    Number of children: None    Years of education: None    Highest education level: None   Tobacco Use    Smoking status: Never    Smokeless tobacco: Never         Clindamycin and Penicillins     Discharge Medication List as of 1/17/2023 10:10 AM        CONTINUE these medications which have NOT CHANGED    Details   lithium (LITHOBID) 300 MG extended release tablet Take 300 mg by mouth 2 times dailyHistorical Med      ondansetron (ZOFRAN-ODT) 4 MG disintegrating tablet Take 1 tablet by mouth 3 times daily as needed for Nausea or Vomiting, Disp-15 tablet, R-1Print      metoclopramide (REGLAN) 10 MG tablet Take 1 tablet by mouth 3 times daily as needed (Nausea), Disp-15 tablet, R-0Print      albuterol sulfate HFA (PROVENTIL;VENTOLIN;PROAIR) 108 (90 Base) MCG/ACT inhaler Inhale 2 puffs into the lungs every 4 hours as neededHistorical Med      amLODIPine (NORVASC) 5 MG tablet Take 5 mg by mouth dailyHistorical Med      cariprazine hcl (VRAYLAR) 1.5 MG capsule Take 1.5 mg by mouthHistorical Med dextroamphetamine (DEXTROSTAT) 10 MG tablet Take 10 mg by mouth daily. Historical Med      docusate (COLACE, DULCOLAX) 100 MG CAPS Take 100 mg by mouth 2 times daily as neededHistorical Med      empagliflozin (JARDIANCE) 25 MG tablet Take 25 mg by mouth dailyHistorical Med      lisinopril (PRINIVIL;ZESTRIL) 5 MG tablet Take 5 mg by mouth dailyHistorical Med      pantoprazole (PROTONIX) 20 MG tablet Take 20 mg by mouth 2 times dailyHistorical Med      sertraline (ZOLOFT) 100 MG tablet Take 100 mg by mouth dailyHistorical Med      SITagliptin (JANUVIA) 50 MG tablet Take 50 mg by mouth dailyHistorical Med              Vitals signs and nursing note reviewed. Patient Vitals for the past 4 hrs:   Temp Pulse Resp BP SpO2   01/17/23 1014 -- 83 18 (!) 149/96 99 %   01/17/23 0913 99 °F (37.2 °C) -- -- -- --   01/17/23 0911 -- 91 18 138/82 98 %          Physical Exam  Vitals and nursing note reviewed. Constitutional:       General: She is not in acute distress. Appearance: She is well-developed. She is obese. She is not ill-appearing or toxic-appearing. HENT:      Head: Normocephalic. Right Ear: Tympanic membrane normal.      Left Ear: Tympanic membrane normal.      Nose: No congestion. Mouth/Throat:      Mouth: Mucous membranes are moist.      Pharynx: No oropharyngeal exudate or posterior oropharyngeal erythema. Eyes:      Extraocular Movements: Extraocular movements intact. Pupils: Pupils are equal, round, and reactive to light. Cardiovascular:      Rate and Rhythm: Normal rate and regular rhythm. Heart sounds: Normal heart sounds. Pulmonary:      Effort: Pulmonary effort is normal.      Breath sounds: Normal breath sounds. Abdominal:      Palpations: Abdomen is soft. Tenderness: There is no abdominal tenderness. Comments: Gravid   Musculoskeletal:         General: Normal range of motion. Cervical back: Normal range of motion and neck supple.    Lymphadenopathy: Cervical: No cervical adenopathy. Skin:     General: Skin is warm and dry. Findings: No rash. Neurological:      General: No focal deficit present. Mental Status: She is alert. Mental status is at baseline. Psychiatric:         Mood and Affect: Mood normal.         Behavior: Behavior normal.         Thought Content: Thought content normal.        Procedures    Results for orders placed or performed during the hospital encounter of 01/17/23   Influenza A/B, Molecular    Specimen: Not Specified   Result Value Ref Range    Influenza A, REJI Not detected NOTD      Influenza B, REJI Not detected NOTD     COVID-19, Rapid    Specimen: Nasopharyngeal   Result Value Ref Range    Source Nasopharyngeal      SARS-CoV-2, Rapid Detected (A) NOTD          No orders to display                       Voice dictation software was used during the making of this note. This software is not perfect and grammatical and other typographical errors may be present. This note has not been completely proofread for errors.          JAISON Saucedo  01/17/23 [id number]

## 2023-01-17 NOTE — Clinical Note
Ruben Sesay was seen and treated in our emergency department on 1/17/2023. She may return to work on 01/26/2023. If you have any questions or concerns, please don't hesitate to call.       JAISON Mackenzie

## 2023-01-17 NOTE — ED NOTES
I have reviewed discharge instructions with the patient. The patient verbalized understanding. Patient left ED via Discharge Method: ambulatory to Home with self    Opportunity for questions and clarification provided. Patient given 1 scripts. To continue your aftercare when you leave the hospital, you may receive an automated call from our care team to check in on how you are doing. This is a free service and part of our promise to provide the best care and service to meet your aftercare needs.  If you have questions, or wish to unsubscribe from this service please call [phone]. Thank you for Choosing our [facility] Emergency Department.         Nicholas Kussmaul, RN  01/17/23 1016

## 2023-03-22 ENCOUNTER — HOSPITAL ENCOUNTER (EMERGENCY)
Age: 33
Discharge: HOME | End: 2023-03-22
Attending: EMERGENCY MEDICINE
Payer: COMMERCIAL

## 2023-03-22 VITALS
HEIGHT: 63 IN | RESPIRATION RATE: 19 BRPM | OXYGEN SATURATION: 100 % | TEMPERATURE: 98.1 F | DIASTOLIC BLOOD PRESSURE: 71 MMHG | BODY MASS INDEX: 48.55 KG/M2 | WEIGHT: 274 LBS | HEART RATE: 80 BPM | SYSTOLIC BLOOD PRESSURE: 137 MMHG

## 2023-03-22 DIAGNOSIS — N30.00 ACUTE CYSTITIS WITHOUT HEMATURIA: ICD-10-CM

## 2023-03-22 DIAGNOSIS — Z3A.28 28 WEEKS GESTATION OF PREGNANCY: Primary | ICD-10-CM

## 2023-03-22 LAB
APPEARANCE UR: ABNORMAL
BACTERIA URNS QL MICRO: ABNORMAL /HPF
BILIRUB UR QL: NEGATIVE
CASTS URNS QL MICRO: 0 /LPF
COLOR UR: ABNORMAL
CRYSTALS URNS QL MICRO: 0 /LPF
EPI CELLS #/AREA URNS HPF: ABNORMAL /HPF
GLUCOSE UR STRIP.AUTO-MCNC: NEGATIVE MG/DL
HGB UR QL STRIP: ABNORMAL
KETONES UR QL STRIP.AUTO: NEGATIVE MG/DL
LEUKOCYTE ESTERASE UR QL STRIP.AUTO: ABNORMAL
MUCOUS THREADS URNS QL MICRO: ABNORMAL /LPF
NITRITE UR QL STRIP.AUTO: NEGATIVE
OTHER OBSERVATIONS: ABNORMAL
PH UR STRIP: 6 (ref 5–9)
PROT UR STRIP-MCNC: 30 MG/DL
RBC #/AREA URNS HPF: ABNORMAL /HPF
SP GR UR REFRACTOMETRY: 1.02 (ref 1–1.02)
UROBILINOGEN UR QL STRIP.AUTO: 0.2 EU/DL (ref 0.2–1)
WBC URNS QL MICRO: ABNORMAL /HPF

## 2023-03-22 PROCEDURE — 81015 MICROSCOPIC EXAM OF URINE: CPT

## 2023-03-22 PROCEDURE — 81003 URINALYSIS AUTO W/O SCOPE: CPT

## 2023-03-22 PROCEDURE — 99283 EMERGENCY DEPT VISIT LOW MDM: CPT

## 2023-03-22 PROCEDURE — 6370000000 HC RX 637 (ALT 250 FOR IP): Performed by: EMERGENCY MEDICINE

## 2023-03-22 RX ORDER — CEFDINIR 300 MG/1
300 CAPSULE ORAL 2 TIMES DAILY
Qty: 14 CAPSULE | Refills: 0 | Status: SHIPPED | OUTPATIENT
Start: 2023-03-22 | End: 2023-03-29

## 2023-03-22 RX ORDER — ACETAMINOPHEN 500 MG
TABLET ORAL
Status: DISCONTINUED
Start: 2023-03-22 | End: 2023-03-22 | Stop reason: HOSPADM

## 2023-03-22 RX ORDER — ACETAMINOPHEN 500 MG
1000 TABLET ORAL
Status: COMPLETED | OUTPATIENT
Start: 2023-03-22 | End: 2023-03-22

## 2023-03-22 RX ADMIN — ACETAMINOPHEN 1000 MG: 500 TABLET, FILM COATED ORAL at 18:17

## 2023-03-22 ASSESSMENT — ENCOUNTER SYMPTOMS
FACIAL SWELLING: 0
EYE PAIN: 0
BACK PAIN: 1
VOICE CHANGE: 0
VOMITING: 0
ABDOMINAL PAIN: 0
CHEST TIGHTNESS: 0
SORE THROAT: 0
NAUSEA: 0
SHORTNESS OF BREATH: 0
TROUBLE SWALLOWING: 0
COUGH: 0

## 2023-03-22 ASSESSMENT — PAIN SCALES - GENERAL
PAINLEVEL_OUTOF10: 10
PAINLEVEL_OUTOF10: 10
PAINLEVEL_OUTOF10: 0

## 2023-03-22 ASSESSMENT — PAIN - FUNCTIONAL ASSESSMENT
PAIN_FUNCTIONAL_ASSESSMENT: 0-10
PAIN_FUNCTIONAL_ASSESSMENT: 0-10

## 2023-03-22 ASSESSMENT — LIFESTYLE VARIABLES
HOW OFTEN DO YOU HAVE A DRINK CONTAINING ALCOHOL: NEVER
HOW MANY STANDARD DRINKS CONTAINING ALCOHOL DO YOU HAVE ON A TYPICAL DAY: PATIENT DOES NOT DRINK

## 2023-03-22 NOTE — ED TRIAGE NOTES
Pt reports 28 weeks pregnant. Reports abd pain and back pain all day. Reports baby has not moved since 1500. Pt reports as she is sitting in triage she suddenly felt her move. Denies vag bleeding.  Pt has DM

## 2023-03-22 NOTE — ED PROVIDER NOTES
Skin:     General: Skin is warm and dry. Capillary Refill: Capillary refill takes less than 2 seconds. Neurological:      General: No focal deficit present. Mental Status: She is alert and oriented to person, place, and time. Mental status is at baseline. Psychiatric:         Mood and Affect: Mood normal.         Behavior: Behavior normal.        Procedures     Orders Placed This Encounter   Procedures    Urinalysis    Urinalysis, Micro        Medications   acetaminophen (TYLENOL) tablet 1,000 mg (1,000 mg Oral Given 3/22/23 1817)       Discharge Medication List as of 3/22/2023  6:42 PM        START taking these medications    Details   cefdinir (OMNICEF) 300 MG capsule Take 1 capsule by mouth 2 times daily for 7 days, Disp-14 capsule, R-0Print              Past Medical History:   Diagnosis Date    Diabetes mellitus ([facility]     Hypertension         No past surgical history on file. No family history on file.      Social History     Socioeconomic History    Marital status: Single   Tobacco Use    Smoking status: Never    Smokeless tobacco: Never        Allergies: Clindamycin and Penicillins    Discharge Medication List as of 3/22/2023  6:42 PM        CONTINUE these medications which have NOT CHANGED    Details   lithium (LITHOBID) 300 MG extended release tablet Take 300 mg by mouth 2 times dailyHistorical Med      ondansetron (ZOFRAN-ODT) 4 MG disintegrating tablet Take 1 tablet by mouth 3 times daily as needed for Nausea or Vomiting, Disp-15 tablet, R-1Print      metoclopramide (REGLAN) 10 MG tablet Take 1 tablet by mouth 3 times daily as needed (Nausea), Disp-15 tablet, R-0Print      albuterol sulfate HFA (PROVENTIL;VENTOLIN;PROAIR) 108 (90 Base) MCG/ACT inhaler Inhale 2 puffs into the lungs every 4 hours as neededHistorical Med      amLODIPine (NORVASC) 5 MG tablet Take 5 mg by mouth dailyHistorical Med      cariprazine hcl (VRAYLAR) 1.5 MG capsule Take 1.5 mg by mouthHistorical Med

## 2023-03-22 NOTE — DISCHARGE INSTRUCTIONS
Please follow-up with your OB. There is no evidence of any emergency to your baby. We have written you for medication for UTI. Please drink plenty of fluids and take Tylenol for any pain control. Please return the emergency department for any worsening abdominal pain or vaginal bleeding.

## 2024-02-05 ENCOUNTER — HOSPITAL ENCOUNTER (EMERGENCY)
Age: 34
Discharge: HOME | End: 2024-02-05
Attending: EMERGENCY MEDICINE
Payer: COMMERCIAL

## 2024-02-05 VITALS
RESPIRATION RATE: 15 BRPM | OXYGEN SATURATION: 96 % | DIASTOLIC BLOOD PRESSURE: 77 MMHG | BODY MASS INDEX: 35.44 KG/M2 | SYSTOLIC BLOOD PRESSURE: 144 MMHG | HEIGHT: 63 IN | TEMPERATURE: 98.7 F | HEART RATE: 80 BPM | WEIGHT: 200 LBS

## 2024-02-05 DIAGNOSIS — L02.31 GLUTEAL ABSCESS: Primary | ICD-10-CM

## 2024-02-05 PROCEDURE — 6370000000 HC RX 637 (ALT 250 FOR IP): Performed by: EMERGENCY MEDICINE

## 2024-02-05 PROCEDURE — 99283 EMERGENCY DEPT VISIT LOW MDM: CPT

## 2024-02-05 RX ORDER — SULFAMETHOXAZOLE AND TRIMETHOPRIM 800; 160 MG/1; MG/1
1 TABLET ORAL 2 TIMES DAILY
Qty: 20 TABLET | Refills: 0 | Status: SHIPPED | OUTPATIENT
Start: 2024-02-05 | End: 2024-02-15

## 2024-02-05 RX ORDER — HYDROCODONE BITARTRATE AND ACETAMINOPHEN 5; 325 MG/1; MG/1
1 TABLET ORAL EVERY 8 HOURS PRN
Qty: 9 TABLET | Refills: 0 | Status: SHIPPED | OUTPATIENT
Start: 2024-02-05 | End: 2024-02-08

## 2024-02-05 RX ORDER — SULFAMETHOXAZOLE AND TRIMETHOPRIM 800; 160 MG/1; MG/1
1 TABLET ORAL
Status: COMPLETED | OUTPATIENT
Start: 2024-02-05 | End: 2024-02-05

## 2024-02-05 RX ADMIN — SULFAMETHOXAZOLE AND TRIMETHOPRIM 1 TABLET: 800; 160 TABLET ORAL at 04:11

## 2024-02-05 ASSESSMENT — PAIN DESCRIPTION - LOCATION: LOCATION: BUTTOCKS

## 2024-02-05 ASSESSMENT — PAIN - FUNCTIONAL ASSESSMENT: PAIN_FUNCTIONAL_ASSESSMENT: 0-10

## 2024-02-05 ASSESSMENT — PAIN SCALES - GENERAL: PAINLEVEL_OUTOF10: 10

## 2024-02-05 NOTE — DISCHARGE INSTRUCTIONS
Do the sitz bath's 3-5 times daily with Epsom salt and warm water.  Take full course of antibiotics as prescribed.  Take the least amount of narcotic pain medicine possible for control of severe pain.  Risk of opioid analgesics include, but are not limited to: Overdose they can stop or slow your breathing and lead to death; fractures from falls; drowsiness leading to injury; tolerance, dependence and addiction. You should not operate any motorized vehicles or work from a height greater than ground level when taking opioid analgesics as they increase your fall risks.

## 2024-02-05 NOTE — ED PROVIDER NOTES
Pulse: 80   Resp: 15   Temp: 98.7 °F (37.1 °C)   SpO2: 96%   Weight: 90.7 kg (200 lb)   Height: 1.6 m (5' 3\")      Physical Exam     GENERAL:The patient has Body mass index is 35.43 kg/m². Well-hydrated.  No acute distress.  VITAL SIGNS: Heart rate, blood pressure, respiratory rate reviewed as recorded in  nurse's notes  EYES: Pupils reactive. Extraocular motion intact. No conjunctival redness or drainage.  LUNGS: No accessory muscle use  CARDIOVASCULAR: Regular rate and rhythm  EXTREMITIES: Pt moving all 4 extremities with out limitations. Normal muscle tone.  NEUROLOGIC: Cranial nerve exam reveals face is symmetrical, tongue is midline  speech is clear. No focal deficits noted  : Exam performed with female nurse tech present.  The patient has open and draining wound to the left lower gluteal fold.  The right lower gluteal fold adjacent to the open wound is loculated and firm with no area of fluctuance present.  No erythema noted.  SKIN: No petechiae. Good skin turgor palpated.  PSYCHIATRIC: Alert and oriented. Appropriate behavior and judgment.      Procedures     Procedures    No orders of the defined types were placed in this encounter.       Medications given during this emergency department visit:  Medications   sulfamethoxazole-trimethoprim (BACTRIM DS;SEPTRA DS) 800-160 MG per tablet 1 tablet (has no administration in time range)       New Prescriptions    HYDROCODONE-ACETAMINOPHEN (NORCO) 5-325 MG PER TABLET    Take 1 tablet by mouth every 8 hours as needed for Pain for up to 3 days. Take lowest dose possible to manage pain Max Daily Amount: 3 tablets    SULFAMETHOXAZOLE-TRIMETHOPRIM (BACTRIM DS) 800-160 MG PER TABLET    Take 1 tablet by mouth 2 times daily for 10 days        Past Medical History:   Diagnosis Date    Diabetes mellitus (HCC)     Hypertension         Past Surgical History:   Procedure Laterality Date    ANKLE SURGERY      AXILLARY SURGERY       SECTION          Social History

## 2025-04-17 ENCOUNTER — HOSPITAL ENCOUNTER (EMERGENCY)
Age: 35
Discharge: HOME | End: 2025-04-17
Payer: COMMERCIAL

## 2025-04-17 VITALS
OXYGEN SATURATION: 97 % | SYSTOLIC BLOOD PRESSURE: 143 MMHG | TEMPERATURE: 98.2 F | BODY MASS INDEX: 41.64 KG/M2 | RESPIRATION RATE: 16 BRPM | DIASTOLIC BLOOD PRESSURE: 93 MMHG | HEIGHT: 63 IN | HEART RATE: 77 BPM | WEIGHT: 235 LBS

## 2025-04-17 DIAGNOSIS — S46.001S INJURY OF RIGHT ROTATOR CUFF, SEQUELA: Primary | ICD-10-CM

## 2025-04-17 DIAGNOSIS — G54.0 BRACHIAL PLEXOPATHY: ICD-10-CM

## 2025-04-17 PROCEDURE — 6370000000 HC RX 637 (ALT 250 FOR IP)

## 2025-04-17 PROCEDURE — 99283 EMERGENCY DEPT VISIT LOW MDM: CPT

## 2025-04-17 RX ORDER — HYDROCODONE BITARTRATE AND ACETAMINOPHEN 5; 325 MG/1; MG/1
1 TABLET ORAL
Refills: 0 | Status: COMPLETED | OUTPATIENT
Start: 2025-04-17 | End: 2025-04-17

## 2025-04-17 RX ORDER — METHOCARBAMOL 750 MG/1
750 TABLET, FILM COATED ORAL 4 TIMES DAILY
Qty: 40 TABLET | Refills: 0 | Status: SHIPPED | OUTPATIENT
Start: 2025-04-17 | End: 2025-04-27

## 2025-04-17 RX ORDER — METHOCARBAMOL 750 MG/1
750 TABLET, FILM COATED ORAL ONCE
Status: COMPLETED | OUTPATIENT
Start: 2025-04-17 | End: 2025-04-17

## 2025-04-17 RX ADMIN — METHOCARBAMOL 750 MG: 750 TABLET ORAL at 20:36

## 2025-04-17 RX ADMIN — HYDROCODONE BITARTRATE AND ACETAMINOPHEN 1 TABLET: 5; 325 TABLET ORAL at 20:36

## 2025-04-17 ASSESSMENT — PAIN - FUNCTIONAL ASSESSMENT: PAIN_FUNCTIONAL_ASSESSMENT: 0-10

## 2025-04-17 ASSESSMENT — PAIN SCALES - GENERAL
PAINLEVEL_OUTOF10: 10
PAINLEVEL_OUTOF10: 10

## 2025-04-17 ASSESSMENT — PAIN DESCRIPTION - ORIENTATION: ORIENTATION: RIGHT

## 2025-04-17 ASSESSMENT — PAIN DESCRIPTION - LOCATION: LOCATION: SHOULDER;NECK

## 2025-04-17 NOTE — ED TRIAGE NOTES
Pt ambulatory to triage with steady gait. Pt reports pain in right shoulder for 1.5 months and has been in PT, states last Tuesday she had worsening pain that radiates up to her neck. Pt states she had a tramadol injection yesterday with no relief. Pt requesting an MRI

## 2025-04-18 NOTE — DISCHARGE INSTRUCTIONS
You were given a referral to follow-up with orthopedics and obtain an outpatient MRI yesterday.  Please follow-up with [facility] regarding these referrals and to schedule appointments.  I will give you a prescription for a muscle relaxer to use in addition to Tylenol and ibuprofen at home.  You may also continue to use heat and gentle stretching.    It was a pleasure taking care of you today.  We sincerely hope that you feel better soon.

## 2025-04-18 NOTE — ED PROVIDER NOTES
Emergency Department Provider Note       PCP: Alanna Rico, APRN - NP   Age: 34 y.o.   Sex: female     DISPOSITION Decision To Discharge 04/17/2025 09:26:55 PM    ICD-10-CM    1. Injury of right rotator cuff, sequela  S46.001S [facility] [employer] Arm Sling, Right (); M      2. Brachial plexopathy  G54.0           Medical Decision Making     Patient presents with ongoing right shoulder, neck pain and right arm numbness.  She has been evaluated on multiple occasions by her family provider.  She has a pending evaluation follow-up with orthopedics and complete a MRI outpatient.  She is overall well-appearing.  Her vital signs are stable.  She is afebrile, without tachycardia or tachypnea.  On exam, she does have decreased sensation and strength in her right arm compared to the left.  She does seem to be exaggerating these findings. This appears consistent with previous evaluations documented by [facility].  Her neck is supple.  Radial pulses +2.  Had long discussion regarding why an MRI would not be completed in the ER today.  Patient eventually agreeable to treating pain today and going home with prescription for muscle relaxer and following up with orthopedics and outpatient MRI as scheduled by [facility].  Return precautions discussed.  Patient verbalized understanding is agreeable for discharge home     1 or more acute illnesses that pose a threat to life or bodily function.   Prescription drug management performed.  Patient was discharged risks and benefits of hospitalization were considered.  Shared medical decision making was utilized in creating the patients health plan today.  I independently ordered and reviewed each unique test.                         History     Patient is a 34-year-old female with past medical history significant for injury to her right rotator cuff with associated right shoulder and neck pain and right arm numbness.  She has been evaluated on multiple occasions by her

## 2025-04-18 NOTE — ED NOTES
Pt c/o right shoulder pain for 6 weeks.  Has been seen and treated by her PCP and ortho for the same c/o.  Has been doing PT for the same c/o .  Requesting a MRI